# Patient Record
Sex: FEMALE | Race: WHITE | NOT HISPANIC OR LATINO | URBAN - METROPOLITAN AREA
[De-identification: names, ages, dates, MRNs, and addresses within clinical notes are randomized per-mention and may not be internally consistent; named-entity substitution may affect disease eponyms.]

---

## 2018-10-29 ENCOUNTER — EMERGENCY (EMERGENCY)
Facility: HOSPITAL | Age: 58
LOS: 1 days | Discharge: HOME | End: 2018-10-29
Attending: EMERGENCY MEDICINE
Payer: MEDICAID

## 2018-10-29 VITALS
DIASTOLIC BLOOD PRESSURE: 71 MMHG | SYSTOLIC BLOOD PRESSURE: 137 MMHG | OXYGEN SATURATION: 98 % | RESPIRATION RATE: 20 BRPM | HEART RATE: 100 BPM | TEMPERATURE: 98 F

## 2018-10-29 DIAGNOSIS — Z98.89 OTHER SPECIFIED POSTPROCEDURAL STATES: Chronic | ICD-10-CM

## 2018-10-29 PROCEDURE — 99283 EMERGENCY DEPT VISIT LOW MDM: CPT

## 2018-10-29 NOTE — ED PROVIDER NOTE - OBJECTIVE STATEMENT
59yo F no sig pmhx p/w CC head injury. She states earlier this morning her  was installing medicine cabinet when it fell and the side of it hit her L forehead, she denies LOC, not on AC. She states she has only had a slight headache since but was worried about falling asleep so she came to the ER. Pt. denies visual changes, cp, sob, n/v/d, numbness/tingling/weakness.

## 2018-10-29 NOTE — ED ADULT TRIAGE NOTE - CHIEF COMPLAINT QUOTE
10am hit in head by medicine cabinet that fell of the wall. Hit in forehead. no laceration of bleeding. No LOC.

## 2018-10-29 NOTE — ED PROVIDER NOTE - MEDICAL DECISION MAKING DETAILS
59yo F no sig pmhx p/w CC head injury, she is extremely well appearing, [place] Head CT negative. She is declining pain meds at this time. Will reassure and DC home.

## 2019-11-26 ENCOUNTER — RESULT REVIEW (OUTPATIENT)
Age: 59
End: 2019-11-26

## 2019-12-03 ENCOUNTER — APPOINTMENT (OUTPATIENT)
Dept: UROGYNECOLOGY | Facility: CLINIC | Age: 59
End: 2019-12-03
Payer: COMMERCIAL

## 2019-12-03 VITALS
BODY MASS INDEX: 22.73 KG/M2 | HEIGHT: 68 IN | DIASTOLIC BLOOD PRESSURE: 72 MMHG | SYSTOLIC BLOOD PRESSURE: 123 MMHG | WEIGHT: 150 LBS

## 2019-12-03 DIAGNOSIS — K36 OTHER APPENDICITIS: ICD-10-CM

## 2019-12-03 DIAGNOSIS — Z87.440 PERSONAL HISTORY OF URINARY (TRACT) INFECTIONS: ICD-10-CM

## 2019-12-03 DIAGNOSIS — Z80.0 FAMILY HISTORY OF MALIGNANT NEOPLASM OF DIGESTIVE ORGANS: ICD-10-CM

## 2019-12-03 DIAGNOSIS — N36.41 HYPERMOBILITY OF URETHRA: ICD-10-CM

## 2019-12-03 DIAGNOSIS — K35.32 ACUTE APPENDICITIS W/ PERFORATION AND LOCALIZED PERITONITIS, W/O ABSCESS: ICD-10-CM

## 2019-12-03 DIAGNOSIS — N39.3 STRESS INCONTINENCE (FEMALE) (MALE): ICD-10-CM

## 2019-12-03 LAB
BILIRUB UR QL STRIP: NORMAL
CLARITY UR: CLEAR
COLLECTION METHOD: NORMAL
GLUCOSE UR-MCNC: NORMAL
HCG UR QL: 0.2 EU/DL
HGB UR QL STRIP.AUTO: NORMAL
KETONES UR-MCNC: NORMAL
LEUKOCYTE ESTERASE UR QL STRIP: NORMAL
NITRITE UR QL STRIP: NORMAL
PH UR STRIP: 7.5
PROT UR STRIP-MCNC: NORMAL
SP GR UR STRIP: 1.01

## 2019-12-03 PROCEDURE — 99204 OFFICE O/P NEW MOD 45 MIN: CPT

## 2019-12-03 PROCEDURE — 81003 URINALYSIS AUTO W/O SCOPE: CPT | Mod: NC,QW

## 2019-12-03 NOTE — HISTORY OF PRESENT ILLNESS
[Urge Incontinence Of Urine] : none [Stress Incontinence] : none [] : years ago [Unable To Restrain Bowel Movement] : none [Urinary Frequency] : none [Feelings Of Urinary Urgency] : none [Urinary Tract Infection] : none [x1] : once nightly [Incomplete Emptying Of Stool] : none [de-identified] : hard cough or sneeze [de-identified] : few [de-identified] : changes underwear if leaks [de-identified] : drinks several galsses and coffee day [de-identified] : + sexually active [FreeTextEntry1] : pt doing Kegels daily\par ROS as per questionnaire.\par

## 2019-12-03 NOTE — PHYSICAL EXAM
[5] : 5 [Post Void Residual ____ml] : post void residual via catheterization was [unfilled] ml [No Acute Distress] : in no acute distress [Well developed] : well developed [Well Nourished] : ~L well nourished [Bulbocavernous] : bulbocavernous was present [Oriented x3] : oriented to person, place, and time [Anal Reflex] : the anal reflex was present [Warm and Dry] : was warm and dry to touch [Normal Appearance] : general appearance was normal [Normal Gait] : gait was normal [Normal] : normal [Uterine Adnexae] : were not tender and not enlarged [Exam Deferred] : was deferred [Tenderness] : ~T no ~M abdominal tenderness observed [Distended] : not distended [H/Smegaly] : no hepatosplenomegaly [Inguinal LAD] : no adenopathy was noted in the inguinal lymph nodes [FreeTextEntry3] : + hypermobility

## 2021-06-01 ENCOUNTER — RESULT REVIEW (OUTPATIENT)
Age: 61
End: 2021-06-01

## 2022-05-27 ENCOUNTER — APPOINTMENT (OUTPATIENT)
Dept: OBGYN | Facility: CLINIC | Age: 62
End: 2022-05-27
Payer: COMMERCIAL

## 2022-05-27 PROCEDURE — 56820 COLPOSCOPY VULVA: CPT

## 2022-05-27 PROCEDURE — 81002 URINALYSIS NONAUTO W/O SCOPE: CPT

## 2022-05-27 PROCEDURE — 99213 OFFICE O/P EST LOW 20 MIN: CPT | Mod: 25

## 2022-06-24 ENCOUNTER — APPOINTMENT (OUTPATIENT)
Dept: OBGYN | Facility: CLINIC | Age: 62
End: 2022-06-24

## 2022-06-24 PROCEDURE — 81002 URINALYSIS NONAUTO W/O SCOPE: CPT

## 2022-06-24 PROCEDURE — 99213 OFFICE O/P EST LOW 20 MIN: CPT | Mod: 25

## 2022-06-24 PROCEDURE — 83986 ASSAY PH BODY FLUID NOS: CPT | Mod: QW

## 2022-06-24 PROCEDURE — 87220 TISSUE EXAM FOR FUNGI: CPT

## 2022-06-24 PROCEDURE — 87210 SMEAR WET MOUNT SALINE/INK: CPT | Mod: QW

## 2022-07-29 ENCOUNTER — RESULT REVIEW (OUTPATIENT)
Age: 62
End: 2022-07-29

## 2022-07-29 ENCOUNTER — APPOINTMENT (OUTPATIENT)
Dept: OBGYN | Facility: CLINIC | Age: 62
End: 2022-07-29

## 2022-07-29 PROCEDURE — 81002 URINALYSIS NONAUTO W/O SCOPE: CPT

## 2022-07-29 PROCEDURE — 82270 OCCULT BLOOD FECES: CPT

## 2022-07-29 PROCEDURE — 99396 PREV VISIT EST AGE 40-64: CPT | Mod: 25

## 2022-08-17 ENCOUNTER — APPOINTMENT (OUTPATIENT)
Dept: OBGYN | Facility: CLINIC | Age: 62
End: 2022-08-17

## 2023-04-14 ENCOUNTER — APPOINTMENT (OUTPATIENT)
Dept: OBGYN | Facility: CLINIC | Age: 63
End: 2023-04-14
Payer: COMMERCIAL

## 2023-04-14 PROCEDURE — 81002 URINALYSIS NONAUTO W/O SCOPE: CPT

## 2023-04-14 PROCEDURE — 99213 OFFICE O/P EST LOW 20 MIN: CPT | Mod: 25

## 2023-04-14 PROCEDURE — 56820 COLPOSCOPY VULVA: CPT

## 2024-02-02 ENCOUNTER — APPOINTMENT (OUTPATIENT)
Dept: OBGYN | Facility: CLINIC | Age: 64
End: 2024-02-02
Payer: COMMERCIAL

## 2024-02-02 PROCEDURE — 82270 OCCULT BLOOD FECES: CPT

## 2024-02-02 PROCEDURE — 81002 URINALYSIS NONAUTO W/O SCOPE: CPT

## 2024-02-02 PROCEDURE — 99396 PREV VISIT EST AGE 40-64: CPT | Mod: 25

## 2024-08-27 ENCOUNTER — APPOINTMENT (OUTPATIENT)
Dept: OBGYN | Facility: CLINIC | Age: 64
End: 2024-08-27
Payer: COMMERCIAL

## 2024-08-27 PROCEDURE — 99213 OFFICE O/P EST LOW 20 MIN: CPT

## 2025-02-07 ENCOUNTER — APPOINTMENT (OUTPATIENT)
Dept: OBGYN | Facility: CLINIC | Age: 65
End: 2025-02-07

## 2025-02-07 ENCOUNTER — RESULT REVIEW (OUTPATIENT)
Age: 65
End: 2025-02-07

## 2025-02-07 PROCEDURE — 81002 URINALYSIS NONAUTO W/O SCOPE: CPT

## 2025-02-07 PROCEDURE — 99396 PREV VISIT EST AGE 40-64: CPT

## 2025-02-07 PROCEDURE — 99459 PELVIC EXAMINATION: CPT

## 2025-02-07 PROCEDURE — 82270 OCCULT BLOOD FECES: CPT

## 2025-02-12 ENCOUNTER — NON-APPOINTMENT (OUTPATIENT)
Age: 65
End: 2025-02-12